# Patient Record
(demographics unavailable — no encounter records)

---
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
## 2025-06-27 NOTE — HISTORY OF PRESENT ILLNESS
[FreeTextEntry1] : 36yo  LMP 3/31/25 EWELINA 26 here for consultation regarding need for colposcopy. Dari reports that she received HPV vaccine as an adolescent. She was found to have HPV on Pap at age 20 but thereafter tested negative for HPV until (age 33) when Pap done postpartum 2021 NILM/+HPV. Pap 2022 NILM/+HPV -> colposcopy with LGSIL on cx bx. 2023 again NILM/+HPV, and in 2024 Pap ASCUS but HPV Negative. Colpscopy again revealed LGSIL on cx bx. Pap 2025 ASCUS, HPV+ was followed by Colposcopy with cx bx at 1 o'clock showing "at least LGSIL" with strong p16 expression but morphologic features c/w LGSIL. Dari is now pregnant and suggestion has been made that she have colposcopy again in second trimester.    2022 Normal Pap, +HPV, colpo low grade 2023 Normal Pap, +HPV 2024 Abnormal Pap, no HPV, colpo low grade 2025 Abnormal Pap, +HPV, colpo low grade by Dr. German ([facility])--told she needs a repeat colpo 2nd trimester

## 2025-06-27 NOTE — HISTORY OF PRESENT ILLNESS
[FreeTextEntry1] : 36yo  LMP 3/31/25 EWELINA 26 here for consultation regarding need for colposcopy. Dari reports that she received HPV vaccine as an adolescent. She was found to have HPV on Pap at age 20 but thereafter tested negative for HPV until (age 33) when Pap done postpartum 2021 NILM/+HPV. Pap 2022 NILM/+HPV -> colposcopy with LGSIL on cx bx. 2023 again NILM/+HPV, and in 2024 Pap ASCUS but HPV Negative. Colpscopy again revealed LGSIL on cx bx. Pap 2025 ASCUS, HPV+ was followed by Colposcopy with cx bx at 1 o'clock showing "at least LGSIL" with strong p16 expression but morphologic features c/w LGSIL. Dari is now pregnant and suggestion has been made that she have colposcopy again in second trimester.    2022 Normal Pap, +HPV, colpo low grade 2023 Normal Pap, +HPV 2024 Abnormal Pap, no HPV, colpo low grade 2025 Abnormal Pap, +HPV, colpo low grade by Dr. German ([facility])--told she needs a repeat colpo 2nd trimester

## 2025-06-27 NOTE — PLAN
[FreeTextEntry1] : Given stable/persistent LGSIL would recommend repeat Pap/HPV at postpartum visit and further colposcopy based on that testing postpartum.